# Patient Record
Sex: MALE | Race: WHITE | NOT HISPANIC OR LATINO | URBAN - METROPOLITAN AREA
[De-identification: names, ages, dates, MRNs, and addresses within clinical notes are randomized per-mention and may not be internally consistent; named-entity substitution may affect disease eponyms.]

---

## 2021-08-28 ENCOUNTER — TELEPHONE ENCOUNTER (OUTPATIENT)
Dept: URBAN - METROPOLITAN AREA CLINIC 13 | Facility: CLINIC | Age: 51
End: 2021-08-28

## 2021-08-29 ENCOUNTER — TELEPHONE ENCOUNTER (OUTPATIENT)
Dept: URBAN - METROPOLITAN AREA CLINIC 13 | Facility: CLINIC | Age: 51
End: 2021-08-29

## 2023-05-24 ENCOUNTER — CLAIMS CREATED FROM THE CLAIM WINDOW (OUTPATIENT)
Dept: URBAN - NONMETROPOLITAN AREA CLINIC 9 | Facility: CLINIC | Age: 53
End: 2023-05-24
Payer: COMMERCIAL

## 2023-05-24 ENCOUNTER — LAB OUTSIDE AN ENCOUNTER (OUTPATIENT)
Dept: URBAN - NONMETROPOLITAN AREA CLINIC 9 | Facility: CLINIC | Age: 53
End: 2023-05-24

## 2023-05-24 VITALS
WEIGHT: 156 LBS | OXYGEN SATURATION: 98 % | HEART RATE: 65 BPM | DIASTOLIC BLOOD PRESSURE: 62 MMHG | HEIGHT: 68 IN | BODY MASS INDEX: 23.64 KG/M2 | SYSTOLIC BLOOD PRESSURE: 111 MMHG

## 2023-05-24 DIAGNOSIS — R10.13 EPIGASTRIC PAIN: ICD-10-CM

## 2023-05-24 DIAGNOSIS — R19.4 CHANGE IN BOWEL HABITS: ICD-10-CM

## 2023-05-24 DIAGNOSIS — R09.89 GLOBUS SENSATION: ICD-10-CM

## 2023-05-24 PROCEDURE — 99204 OFFICE O/P NEW MOD 45 MIN: CPT | Performed by: NURSE PRACTITIONER

## 2023-05-24 PROCEDURE — 99204 OFFICE O/P NEW MOD 45 MIN: CPT | Performed by: INTERNAL MEDICINE

## 2023-05-24 RX ORDER — PANTOPRAZOLE SODIUM 40 MG/1
TABLET, DELAYED RELEASE ORAL
Qty: 60 TABLET | Status: ACTIVE | COMMUNITY

## 2023-05-24 RX ORDER — SUCRALFATE 1 G/1
1 TABLET ON AN EMPTY STOMACH TABLET ORAL THREE TIMES A DAY
Qty: 42 TABLET | Refills: 0 | OUTPATIENT
Start: 2023-05-24 | End: 2023-06-23

## 2023-05-24 RX ORDER — MOMETASONE FUROATE 1 MG/G
CREAM TOPICAL
Qty: 45 GRAM | Status: ACTIVE | COMMUNITY

## 2023-05-24 NOTE — HPI-TODAY'S VISIT:
52 year old male presents for evaluation of epigastric pain. The patient has mild GERD and globus sensation. He was diagnosed with a gastric ulcer in 2/2023 per EGD by Dr. Self. The patient has been on Pantoprazole 40mg daily. He has made dietary changes and it has not helped completely. Two weeks ago, he has had epigastric burning, fatigue, and queasiness. On 5/15/23, he started taking Pantoprazole BID but it did not help much until this past weekend. Bowel movements are daily of multiple, loose or soft stools. He tried Metamucil last fall. Denies blood in stool. Last colonoscopy was 2/2023 and showed a colon polyp and mild internal hemorrhoids.
None known

## 2023-06-14 ENCOUNTER — TELEPHONE ENCOUNTER (OUTPATIENT)
Dept: URBAN - METROPOLITAN AREA CLINIC 46 | Facility: CLINIC | Age: 53
End: 2023-06-14

## 2023-06-14 RX ORDER — FAMOTIDINE 40 MG/1
1 TABLET AS NEEDED FOR REFLUX TABLET, FILM COATED ORAL ONCE A DAY
Qty: 30 TABLET | Refills: 3 | OUTPATIENT
Start: 2023-06-15

## 2023-06-21 ENCOUNTER — OFFICE VISIT (OUTPATIENT)
Dept: URBAN - METROPOLITAN AREA SURGERY CENTER 28 | Facility: SURGERY CENTER | Age: 53
End: 2023-06-21

## 2023-06-21 ENCOUNTER — OFFICE VISIT (OUTPATIENT)
Dept: URBAN - NONMETROPOLITAN AREA CLINIC 9 | Facility: CLINIC | Age: 53
End: 2023-06-21

## 2023-07-20 ENCOUNTER — ERX REFILL RESPONSE (OUTPATIENT)
Dept: URBAN - METROPOLITAN AREA CLINIC 46 | Facility: CLINIC | Age: 53
End: 2023-07-20

## 2023-07-20 RX ORDER — FAMOTIDINE 40 MG/1
1 TABLET AS NEEDED FOR REFLUX TABLET, FILM COATED ORAL ONCE A DAY
Qty: 30 TABLET | Refills: 3 | OUTPATIENT

## 2023-07-20 RX ORDER — FAMOTIDINE 40 MG/1
1 TABLET AS NEEDED FOR REFLUX ORALLY ONCE A DAY 30 DAYS TABLET, FILM COATED ORAL
Qty: 30 TABLET | Refills: 3 | OUTPATIENT

## 2023-08-16 ENCOUNTER — OFFICE VISIT (OUTPATIENT)
Dept: URBAN - NONMETROPOLITAN AREA CLINIC 9 | Facility: CLINIC | Age: 53
End: 2023-08-16
Payer: COMMERCIAL

## 2023-08-16 VITALS
SYSTOLIC BLOOD PRESSURE: 121 MMHG | HEART RATE: 68 BPM | DIASTOLIC BLOOD PRESSURE: 65 MMHG | HEIGHT: 68 IN | WEIGHT: 149.9 LBS | BODY MASS INDEX: 22.72 KG/M2 | OXYGEN SATURATION: 99 %

## 2023-08-16 DIAGNOSIS — R10.13 EPIGASTRIC PAIN: ICD-10-CM

## 2023-08-16 DIAGNOSIS — R09.89 GLOBUS SENSATION: ICD-10-CM

## 2023-08-16 DIAGNOSIS — R14.3 FLATUS: ICD-10-CM

## 2023-08-16 DIAGNOSIS — R19.4 CHANGE IN BOWEL HABITS: ICD-10-CM

## 2023-08-16 PROBLEM — 249504006: Status: ACTIVE | Noted: 2023-08-16

## 2023-08-16 PROBLEM — 79922009: Status: ACTIVE | Noted: 2023-08-16

## 2023-08-16 PROBLEM — 88111009: Status: ACTIVE | Noted: 2023-08-16

## 2023-08-16 PROBLEM — 89362005: Status: ACTIVE | Noted: 2023-08-16

## 2023-08-16 PROBLEM — 267103008: Status: ACTIVE | Noted: 2023-08-16

## 2023-08-16 PROCEDURE — 99214 OFFICE O/P EST MOD 30 MIN: CPT | Performed by: INTERNAL MEDICINE

## 2023-08-16 RX ORDER — FAMOTIDINE 40 MG/1
1 TABLET AT BEDTIME AS NEEDED TABLET, FILM COATED ORAL ONCE A DAY
Qty: 30 TABLET | Refills: 0 | Status: ACTIVE | COMMUNITY

## 2023-08-16 RX ORDER — PANTOPRAZOLE SODIUM 40 MG/1
1 TABLET TABLET, DELAYED RELEASE ORAL ONCE A DAY
Qty: 60 TABLET | Status: ACTIVE | COMMUNITY

## 2023-08-16 RX ORDER — SUCRALFATE 1 G/1
1 TABLET ON AN EMPTY STOMACH TABLET ORAL THREE TIMES A DAY
Qty: 30 TABLET | Refills: 0 | OUTPATIENT
Start: 2023-08-16 | End: 2023-09-15

## 2023-08-16 NOTE — HPI-TODAY'S VISIT:
52 year old male presents for follow up. Last seen for evaluation of epigastric pain, GERD, globus sensation, and flatulence. of epigastric pain. He was diagnosed with a gastric ulcer in 2/2023 per EGD by Dr. Self. The patient had been on Pantoprazole 40mg daily Sucralfate 1gm was added. He does not recall taking Sucralfate. Since then, he has stopped Pantoprazole daily, but used it twice. The patient has lost more weight. His diet is very strict and he avoids gluten and dairy. Gas continues but ususally has an acute onset. Bowel movements have improved since starting supplemental fiber. Denies blood in stool. Last colonoscopy was 2/2023 and showed a colon polyp and mild internal hemorrhoids.

## 2023-08-17 ENCOUNTER — TELEPHONE ENCOUNTER (OUTPATIENT)
Dept: URBAN - METROPOLITAN AREA CLINIC 44 | Facility: CLINIC | Age: 53
End: 2023-08-17

## 2023-11-15 ENCOUNTER — OFFICE VISIT (OUTPATIENT)
Dept: URBAN - METROPOLITAN AREA TELEHEALTH 2 | Facility: TELEHEALTH | Age: 53
End: 2023-11-15
Payer: COMMERCIAL

## 2023-11-15 ENCOUNTER — DASHBOARD ENCOUNTERS (OUTPATIENT)
Age: 53
End: 2023-11-15

## 2023-11-15 VITALS — BODY MASS INDEX: 24.55 KG/M2 | WEIGHT: 162 LBS | HEIGHT: 68 IN

## 2023-11-15 DIAGNOSIS — R10.13 EPIGASTRIC PAIN: ICD-10-CM

## 2023-11-15 DIAGNOSIS — R14.0 ABDOMINAL BLOATING: ICD-10-CM

## 2023-11-15 DIAGNOSIS — K21.9 GASTROESOPHAGEAL REFLUX DISEASE WITHOUT ESOPHAGITIS: ICD-10-CM

## 2023-11-15 DIAGNOSIS — R63.4 WEIGHT LOSS: ICD-10-CM

## 2023-11-15 PROBLEM — 266435005: Status: ACTIVE | Noted: 2023-11-15

## 2023-11-15 PROBLEM — 116289008: Status: ACTIVE | Noted: 2023-11-15

## 2023-11-15 PROCEDURE — 99213 OFFICE O/P EST LOW 20 MIN: CPT | Performed by: NURSE PRACTITIONER

## 2023-11-15 PROCEDURE — 99213 OFFICE O/P EST LOW 20 MIN: CPT | Performed by: INTERNAL MEDICINE

## 2023-11-15 RX ORDER — PANTOPRAZOLE SODIUM 40 MG/1
1 TABLET TABLET, DELAYED RELEASE ORAL ONCE A DAY
Qty: 60 TABLET | Status: ACTIVE | COMMUNITY

## 2023-11-15 RX ORDER — FAMOTIDINE 40 MG/1
1 TABLET AT BEDTIME AS NEEDED TABLET, FILM COATED ORAL ONCE A DAY
Qty: 30 TABLET | Refills: 0 | Status: ACTIVE | COMMUNITY

## 2023-11-15 NOTE — HPI-TODAY'S VISIT:
53 year old male presents for follow up. Seen previously for evaluation of epigastric pain, GERD, globus sensation, and flatulence. He was diagnosed with a gastric ulcer in 2/2023 per EGD by Dr. Self. The patient had been on Pantoprazole 40mg daily Sucralfate 1gm was added.  Since then, he has stopped Pantoprazole and is currently taking Famotidine 40mg daily. After completing Sucralfate, he has felt better. He is liftingh weights and eating more food and drinking coffee some. His diet is very strict and he avoids dairy and some gluten. Celiac panel was negative. Bowel movements have improved since starting supplemental fiber. Denies blood in stool. Last colonoscopy was 2/2023 and showed a colon polyp and mild internal hemorrhoids.

## 2023-11-17 ENCOUNTER — TELEPHONE ENCOUNTER (OUTPATIENT)
Dept: URBAN - METROPOLITAN AREA CLINIC 46 | Facility: CLINIC | Age: 53
End: 2023-11-17

## 2023-12-11 ENCOUNTER — TELEPHONE ENCOUNTER (OUTPATIENT)
Dept: URBAN - NONMETROPOLITAN AREA CLINIC 9 | Facility: CLINIC | Age: 53
End: 2023-12-11

## 2024-01-22 ENCOUNTER — ERX REFILL RESPONSE (OUTPATIENT)
Dept: URBAN - METROPOLITAN AREA CLINIC 46 | Facility: CLINIC | Age: 54
End: 2024-01-22

## 2024-01-22 RX ORDER — FAMOTIDINE 40 MG/1
1 TABLET AT BEDTIME AS NEEDED TABLET, FILM COATED ORAL ONCE A DAY
Qty: 30 TABLET | Refills: 0 | OUTPATIENT

## 2024-03-25 PROBLEM — 90458007: Status: ACTIVE | Noted: 2024-03-25

## 2024-03-25 PROBLEM — 12063002: Status: ACTIVE | Noted: 2024-03-25

## 2025-05-20 ENCOUNTER — OFFICE VISIT (OUTPATIENT)
Dept: URBAN - METROPOLITAN AREA CLINIC 48 | Facility: CLINIC | Age: 55
End: 2025-05-20
Payer: COMMERCIAL

## 2025-05-20 DIAGNOSIS — K62.5 RECTAL BLEEDING: ICD-10-CM

## 2025-05-20 DIAGNOSIS — K64.8 INTERNAL HEMORRHOIDS: ICD-10-CM

## 2025-05-20 DIAGNOSIS — R10.13 EPIGASTRIC PAIN: ICD-10-CM

## 2025-05-20 DIAGNOSIS — K59.01 SLOW TRANSIT CONSTIPATION: ICD-10-CM

## 2025-05-20 PROBLEM — 35298007: Status: ACTIVE | Noted: 2025-05-20

## 2025-05-20 PROCEDURE — 99214 OFFICE O/P EST MOD 30 MIN: CPT | Performed by: INTERNAL MEDICINE

## 2025-05-20 RX ORDER — SUCRALFATE 1 G/1
1 TABLET ON AN EMPTY STOMACH TABLET ORAL
Qty: 90 TABLET | Refills: 0 | OUTPATIENT
Start: 2025-05-20

## 2025-05-20 RX ORDER — PANTOPRAZOLE SODIUM 40 MG/1
1 TABLET 1/2 TO 1 HOUR BEFORE MORNING MEAL TABLET, DELAYED RELEASE ORAL ONCE A DAY
Qty: 30 | Refills: 3 | OUTPATIENT
Start: 2025-05-20

## 2025-05-20 RX ORDER — FAMOTIDINE 40 MG/1
1 TABLET AT BEDTIME TABLET, FILM COATED ORAL ONCE A DAY
Qty: 90 TABLET | Refills: 3 | Status: ON HOLD | COMMUNITY
Start: 2024-02-05

## 2025-05-20 RX ORDER — NICOTINE 14MG/24HR
AS DIRECTED PATCH, TRANSDERMAL 24 HOURS TRANSDERMAL
Status: ACTIVE | COMMUNITY

## 2025-05-20 RX ORDER — PANTOPRAZOLE SODIUM 40 MG/1
1 TABLET TABLET, DELAYED RELEASE ORAL ONCE A DAY
Qty: 60 TABLET | Status: ON HOLD | COMMUNITY

## 2025-05-20 NOTE — HPI-TODAY'S VISIT:
54 year old male presents for follow up. In 2/2025, he had reflux and upper GI discomfort along with some gas. He had a telethealth PCP appt and was started on Pantoprazole which he has stopped a month ago. He continued probiotic and fiber gummies. Denied NSAIDs at the time. His stomach bothers him more if he eats a larger meal. Previously on Famotidine 20 and 40mg and prior to that, he was on Pantoprazole 40mg but had stopped. Seen in 2023 and 2024 for evaluation of epigastric pain, GERD, globus sensation, and flatulence. He was diagnosed with a gastric ulcer in 2/2023 per EGD by Dr. Self.  After completing Sucralfate, he felt better. His diet is very strict and he avoids dairy and some gluten. Celiac panel was negative. Last year he had some red rectal bleeding that was felt to be hemorrhoidal at which time he started Metamucil. He notes recent constipation but no further rectal bleeding. He did stop fiber after being cosntipated. Last colonoscopy was 2/2023 and showed a colon polyp and mild internal hemorrhoids.

## 2025-06-16 ENCOUNTER — ERX REFILL RESPONSE (OUTPATIENT)
Dept: URBAN - METROPOLITAN AREA CLINIC 44 | Facility: CLINIC | Age: 55
End: 2025-06-16

## 2025-06-16 RX ORDER — SUCRALFATE 1 G/1
TAKE 1 TABLET BY MOUTH THREE TIMES A DAY ON EMPTY STOMACH TABLET ORAL
Qty: 90 TABLET | Refills: 0

## 2025-07-22 ENCOUNTER — OFFICE VISIT (OUTPATIENT)
Dept: URBAN - NONMETROPOLITAN AREA CLINIC 11 | Facility: CLINIC | Age: 55
End: 2025-07-22

## 2025-08-18 ENCOUNTER — ERX REFILL RESPONSE (OUTPATIENT)
Dept: URBAN - METROPOLITAN AREA CLINIC 44 | Facility: CLINIC | Age: 55
End: 2025-08-18

## 2025-08-18 RX ORDER — PANTOPRAZOLE SODIUM 40 MG/1
1 TABLET 1/2 TO 1 HOUR BEFORE MORNING MEAL TABLET, DELAYED RELEASE ORAL ONCE A DAY
Qty: 30 | Refills: 3 | OUTPATIENT

## 2025-08-18 RX ORDER — PANTOPRAZOLE SODIUM 40 MG/1
TAKE 1 TABLET BY MOUTH EVERY DAY 1/2 TO 1 HOUR BEFORE MORNING MEAL TABLET, DELAYED RELEASE ORAL
Qty: 90 TABLET | Refills: 1 | OUTPATIENT